# Patient Record
Sex: FEMALE
[De-identification: names, ages, dates, MRNs, and addresses within clinical notes are randomized per-mention and may not be internally consistent; named-entity substitution may affect disease eponyms.]

---

## 2021-05-25 ENCOUNTER — HOSPITAL ENCOUNTER (EMERGENCY)
Dept: HOSPITAL 8 - ED | Age: 46
Discharge: HOME | End: 2021-05-25
Payer: MEDICAID

## 2021-05-25 VITALS — WEIGHT: 204.15 LBS | HEIGHT: 67 IN | BODY MASS INDEX: 32.04 KG/M2

## 2021-05-25 VITALS — DIASTOLIC BLOOD PRESSURE: 59 MMHG | SYSTOLIC BLOOD PRESSURE: 127 MMHG

## 2021-05-25 DIAGNOSIS — K21.9: Primary | ICD-10-CM

## 2021-05-25 DIAGNOSIS — K29.00: ICD-10-CM

## 2021-05-25 LAB
ALBUMIN SERPL-MCNC: 3.8 G/DL (ref 3.4–5)
ALP SERPL-CCNC: 43 U/L (ref 45–117)
ALT SERPL-CCNC: 28 U/L (ref 12–78)
ANION GAP SERPL CALC-SCNC: 4 MMOL/L (ref 5–15)
BASOPHILS # BLD AUTO: 0.1 X10^3/UL (ref 0–0.1)
BASOPHILS NFR BLD AUTO: 1 % (ref 0–1)
BILIRUB SERPL-MCNC: 0.3 MG/DL (ref 0.2–1)
CALCIUM SERPL-MCNC: 9.4 MG/DL (ref 8.5–10.1)
CHLORIDE SERPL-SCNC: 111 MMOL/L (ref 98–107)
CREAT SERPL-MCNC: 0.74 MG/DL (ref 0.55–1.02)
EOSINOPHIL # BLD AUTO: 0.2 X10^3/UL (ref 0–0.4)
EOSINOPHIL NFR BLD AUTO: 2 % (ref 1–7)
ERYTHROCYTE [DISTWIDTH] IN BLOOD BY AUTOMATED COUNT: 13.2 % (ref 9.6–15.2)
LYMPHOCYTES # BLD AUTO: 2.2 X10^3/UL (ref 1–3.4)
LYMPHOCYTES NFR BLD AUTO: 25 % (ref 22–44)
MCH RBC QN AUTO: 28.6 PG (ref 27–34.8)
MCHC RBC AUTO-ENTMCNC: 34 G/DL (ref 32.4–35.8)
MD: NO
MONOCYTES # BLD AUTO: 1 X10^3/UL (ref 0.2–0.8)
MONOCYTES NFR BLD AUTO: 11 % (ref 2–9)
NEUTROPHILS # BLD AUTO: 5.6 X10^3/UL (ref 1.8–6.8)
NEUTROPHILS NFR BLD AUTO: 61 % (ref 42–75)
PLATELET # BLD AUTO: 387 X10^3/UL (ref 130–400)
PMV BLD AUTO: 7.9 FL (ref 7.4–10.4)
PROT SERPL-MCNC: 8.2 G/DL (ref 6.4–8.2)
RBC # BLD AUTO: 5.08 X10^6/UL (ref 3.82–5.3)

## 2021-05-25 PROCEDURE — 84703 CHORIONIC GONADOTROPIN ASSAY: CPT

## 2021-05-25 PROCEDURE — 83690 ASSAY OF LIPASE: CPT

## 2021-05-25 PROCEDURE — 85025 COMPLETE CBC W/AUTO DIFF WBC: CPT

## 2021-05-25 PROCEDURE — 71045 X-RAY EXAM CHEST 1 VIEW: CPT

## 2021-05-25 PROCEDURE — 36415 COLL VENOUS BLD VENIPUNCTURE: CPT

## 2021-05-25 PROCEDURE — 80053 COMPREHEN METABOLIC PANEL: CPT

## 2021-05-25 PROCEDURE — 99285 EMERGENCY DEPT VISIT HI MDM: CPT

## 2021-05-25 PROCEDURE — 76700 US EXAM ABDOM COMPLETE: CPT

## 2021-05-25 NOTE — NUR
PT WITH CONSTANT EPIGASTRIC PAIN "FOR LONG TIME NOW"  PAIN SEEMS TO BE 
ASSOCIATED WITH EATING HAS TRIED OTC ANTACIDS WITH NO RELIEF.  VSS, TEST 
RESULTS PENDING.  CALL LIGHT W/I REACH